# Patient Record
Sex: MALE | Race: OTHER | Employment: UNEMPLOYED | URBAN - METROPOLITAN AREA
[De-identification: names, ages, dates, MRNs, and addresses within clinical notes are randomized per-mention and may not be internally consistent; named-entity substitution may affect disease eponyms.]

---

## 2020-07-17 ENCOUNTER — HOSPITAL ENCOUNTER (EMERGENCY)
Age: 35
Discharge: HOME | End: 2020-07-18
Attending: EMERGENCY MEDICINE

## 2020-07-17 PROCEDURE — 99283 EMERGENCY DEPT VISIT LOW MDM: CPT

## 2020-07-17 RX ORDER — METHYLPREDNISOLONE SODIUM SUCCINATE 125 MG/2ML
125 INJECTION, POWDER, LYOPHILIZED, FOR SOLUTION INTRAMUSCULAR; INTRAVENOUS ONCE
Status: COMPLETED | OUTPATIENT
Start: 2020-07-17 | End: 2020-07-18

## 2020-07-17 RX ORDER — CEFAZOLIN SODIUM 1 G/50ML
1 INJECTION, SOLUTION INTRAVENOUS CONTINUOUS
Status: DISCONTINUED | OUTPATIENT
Start: 2020-07-17 | End: 2020-07-17 | Stop reason: SDUPTHER

## 2020-07-17 ASSESSMENT — ENCOUNTER SYMPTOMS
SHORTNESS OF BREATH: 0
CHEST TIGHTNESS: 0
DIARRHEA: 0
NAUSEA: 0
ABDOMINAL PAIN: 0
VOMITING: 0

## 2020-07-18 VITALS
RESPIRATION RATE: 16 BRPM | DIASTOLIC BLOOD PRESSURE: 86 MMHG | HEART RATE: 60 BPM | SYSTOLIC BLOOD PRESSURE: 150 MMHG | OXYGEN SATURATION: 96 % | TEMPERATURE: 97.8 F | WEIGHT: 210 LBS | BODY MASS INDEX: 32.89 KG/M2

## 2020-07-18 LAB
A/G RATIO: 1.2 (ref 1.1–2.2)
ALBUMIN SERPL-MCNC: 4.4 G/DL (ref 3.4–5)
ALP BLD-CCNC: 61 U/L (ref 40–129)
ALT SERPL-CCNC: 89 U/L (ref 10–40)
ANION GAP SERPL CALCULATED.3IONS-SCNC: 13 MMOL/L (ref 3–16)
AST SERPL-CCNC: 44 U/L (ref 15–37)
BASOPHILS ABSOLUTE: 0.1 K/UL (ref 0–0.2)
BASOPHILS RELATIVE PERCENT: 0.7 %
BILIRUB SERPL-MCNC: 1 MG/DL (ref 0–1)
BUN BLDV-MCNC: 20 MG/DL (ref 7–20)
C-REACTIVE PROTEIN: 2.8 MG/L (ref 0–5.1)
CALCIUM SERPL-MCNC: 9.6 MG/DL (ref 8.3–10.6)
CHLORIDE BLD-SCNC: 99 MMOL/L (ref 99–110)
CO2: 24 MMOL/L (ref 21–32)
CREAT SERPL-MCNC: 0.7 MG/DL (ref 0.9–1.3)
EOSINOPHILS ABSOLUTE: 0.4 K/UL (ref 0–0.6)
EOSINOPHILS RELATIVE PERCENT: 5.3 %
GFR AFRICAN AMERICAN: >60
GFR NON-AFRICAN AMERICAN: >60
GLOBULIN: 3.6 G/DL
GLUCOSE BLD-MCNC: 126 MG/DL (ref 70–99)
HCT VFR BLD CALC: 47.3 % (ref 40.5–52.5)
HEMOGLOBIN: 16.5 G/DL (ref 13.5–17.5)
LYMPHOCYTES ABSOLUTE: 2.5 K/UL (ref 1–5.1)
LYMPHOCYTES RELATIVE PERCENT: 31.8 %
MCH RBC QN AUTO: 31.2 PG (ref 26–34)
MCHC RBC AUTO-ENTMCNC: 35 G/DL (ref 31–36)
MCV RBC AUTO: 89.1 FL (ref 80–100)
MONOCYTES ABSOLUTE: 0.5 K/UL (ref 0–1.3)
MONOCYTES RELATIVE PERCENT: 6.6 %
NEUTROPHILS ABSOLUTE: 4.4 K/UL (ref 1.7–7.7)
NEUTROPHILS RELATIVE PERCENT: 55.6 %
PDW BLD-RTO: 13.6 % (ref 12.4–15.4)
PLATELET # BLD: 253 K/UL (ref 135–450)
PMV BLD AUTO: 8.2 FL (ref 5–10.5)
POTASSIUM SERPL-SCNC: 3.6 MMOL/L (ref 3.5–5.1)
RBC # BLD: 5.31 M/UL (ref 4.2–5.9)
SEDIMENTATION RATE, ERYTHROCYTE: 13 MM/HR (ref 0–15)
SODIUM BLD-SCNC: 136 MMOL/L (ref 136–145)
TOTAL PROTEIN: 8 G/DL (ref 6.4–8.2)
WBC # BLD: 7.9 K/UL (ref 4–11)

## 2020-07-18 PROCEDURE — 6360000002 HC RX W HCPCS: Performed by: PHYSICIAN ASSISTANT

## 2020-07-18 PROCEDURE — 96375 TX/PRO/DX INJ NEW DRUG ADDON: CPT

## 2020-07-18 PROCEDURE — 86140 C-REACTIVE PROTEIN: CPT

## 2020-07-18 PROCEDURE — 96365 THER/PROPH/DIAG IV INF INIT: CPT

## 2020-07-18 PROCEDURE — 2580000003 HC RX 258: Performed by: PHYSICIAN ASSISTANT

## 2020-07-18 PROCEDURE — 36415 COLL VENOUS BLD VENIPUNCTURE: CPT

## 2020-07-18 PROCEDURE — 80053 COMPREHEN METABOLIC PANEL: CPT

## 2020-07-18 PROCEDURE — 85025 COMPLETE CBC W/AUTO DIFF WBC: CPT

## 2020-07-18 PROCEDURE — 85652 RBC SED RATE AUTOMATED: CPT

## 2020-07-18 RX ORDER — CEPHALEXIN 500 MG/1
500 CAPSULE ORAL 3 TIMES DAILY
Qty: 30 CAPSULE | Refills: 0 | Status: SHIPPED | OUTPATIENT
Start: 2020-07-18 | End: 2020-07-28

## 2020-07-18 RX ORDER — METHYLPREDNISOLONE 4 MG/1
TABLET ORAL
Qty: 1 KIT | Refills: 0 | Status: SHIPPED | OUTPATIENT
Start: 2020-07-18

## 2020-07-18 RX ORDER — SULFAMETHOXAZOLE AND TRIMETHOPRIM 800; 160 MG/1; MG/1
1 TABLET ORAL 2 TIMES DAILY
Qty: 20 TABLET | Refills: 0 | Status: SHIPPED | OUTPATIENT
Start: 2020-07-18 | End: 2020-07-28

## 2020-07-18 RX ADMIN — METHYLPREDNISOLONE SODIUM SUCCINATE 125 MG: 125 INJECTION, POWDER, FOR SOLUTION INTRAMUSCULAR; INTRAVENOUS at 01:32

## 2020-07-18 RX ADMIN — CEFAZOLIN SODIUM 1 G: 1 INJECTION, POWDER, FOR SOLUTION INTRAMUSCULAR; INTRAVENOUS at 01:34

## 2020-07-18 NOTE — ED NOTES
Pt reports no further itching to bilateral shin areas where rash is with dried serous fluid noted on both shins. Discharged as noted. VSS prior to discharge.      Manuel Mullen Post, RN  07/18/20 0792

## 2020-07-18 NOTE — ED PROVIDER NOTES
[facility]        Pt Name: Pk Hong  MRN: [id number]  [place] 1985  Date of evaluation: 7/17/2020  Provider: Katelin Mccullough PA-C  PCP: No primary care provider on file. I have seen and evaluated this patient with my supervising physician Des Fernandez, [address] [place] [address]       Chief Complaint   Patient presents with    Cellulitis     Patient presents to ER with complaints of cellulitis/skin infection to bilateral lower legs. States it started on Monday. HISTORY OF PRESENT ILLNESS   (Location, Timing/Onset, Context/Setting, Quality, Duration, Modifying Factors, Severity, Associated Signs and Symptoms)  Note limiting factors. Pk Hong is a 38 y.o. male with no documented medical history who presents to the emergency department tonight complaining of a rash on his lower legs bilaterally. He states this began on Monday. He thought this might have been poison ivy. He states it has been very itchy and he has been scratching it. It has now become very red and weeping. He denies significant pain around the rash. He denies lower extremity edema. He denies exposure to any known allergen. Nursing Notes were all reviewed and agreed with or any disagreements were addressed in the HPI. REVIEW OF SYSTEMS    (2-9 systems for level 4, 10 or more for level 5)     Review of Systems   Constitutional: Negative for chills and fever. Respiratory: Negative for chest tightness and shortness of breath. Cardiovascular: Negative for chest pain. Gastrointestinal: Negative for abdominal pain, diarrhea, nausea and vomiting. Genitourinary: Negative for dysuria. Skin: Positive for rash. Neurological: Negative for dizziness, light-headedness and headaches. All other systems reviewed and are negative. Positives and Pertinent negatives as per HPI.   Except as noted above in the ROS, all other systems were reviewed and negative. PAST MEDICAL HISTORY   History reviewed. No pertinent past medical history. SURGICAL HISTORY   History reviewed. No pertinent surgical history. CURRENTMEDICATIONS       Previous Medications    No medications on file         ALLERGIES     Patient has no known allergies. FAMILYHISTORY     History reviewed. No pertinent family history. SOCIAL HISTORY       Social History     Tobacco Use    Smoking status: Never Smoker    Smokeless tobacco: Never Used   Substance Use Topics    Alcohol use: No    Drug use: No       SCREENINGS             PHYSICAL EXAM    (up to 7 for level 4, 8 or more for level 5)     ED Triage Vitals [07/17/20 2111]   BP Temp Temp Source Pulse Resp SpO2 Height Weight   (!) 184/99 97.8 °F (36.6 °C) Oral 66 18 100 % -- 210 lb (95.3 kg)       Physical Exam  Vitals signs and nursing note reviewed. Constitutional:       Appearance: He is well-developed. He is not diaphoretic. HENT:      Head: Normocephalic and atraumatic. Eyes:      General:         Right eye: No discharge. Left eye: No discharge. Neck:      Musculoskeletal: Normal range of motion and neck supple. Pulmonary:      Effort: Pulmonary effort is normal. No respiratory distress. Musculoskeletal: Normal range of motion. Skin:     General: Skin is warm and dry. Coloration: Skin is not pale. Findings: Rash present. Rash is vesicular. Comments: Patient has an erythematous vesicular rash over the anterior left lower leg and lateral and posterior right lower leg. There is superimposed cellulitis which I feel is likely from scratching. Rash is very weepy with a yellowish drainage. There is no purulence. Patient has no lower extremity edema. He has 2+ distal pulses. He has full range of motion of his knees and ankles bilaterally without pain. Neurological:      Mental Status: He is alert and oriented to person, place, and time.    Psychiatric: Behavior: Behavior normal.         DIAGNOSTIC RESULTS   LABS:    Labs Reviewed   COMPREHENSIVE METABOLIC PANEL - Abnormal; Notable for the following components:       Result Value    Glucose 126 (*)     CREATININE 0.7 (*)     ALT 89 (*)     AST 44 (*)     All other components within normal limits    Narrative:     Performed at:  [facility]  [address] [facility],  [place], [address]   Phone [phone]   CBC WITH AUTO DIFFERENTIAL    Narrative:     Performed at:  [facility]  [address] [facility],  [place], [address]   Phone [phone]   SEDIMENTATION RATE    Narrative:     Performed at:  [facility]  [address] [facility],  [place], [address]   Phone [phone]   C-REACTIVE PROTEIN       All other labs were within normal range or not returned as of this dictation. PROCEDURES   Unless otherwise noted below, none     Procedures    CRITICAL CARE TIME   N/A    CONSULTS:  None      EMERGENCY DEPARTMENT COURSE and DIFFERENTIAL DIAGNOSIS/MDM:   Vitals:    Vitals:    07/17/20 2111   BP: (!) 184/99   Pulse: 66   Resp: 18   Temp: 97.8 °F (36.6 °C)   TempSrc: Oral   SpO2: 100%   Weight: 210 lb (95.3 kg)       Patient was given the following medications:  Medications   methylPREDNISolone sodium (SOLU-MEDROL) injection 125 mg (125 mg Intravenous Given 7/18/20 0132)   ceFAZolin (ANCEF) 1 g in dextrose 5 % 50 mL IVPB (mini-bag) (0 g Intravenous Stopped 7/18/20 0204)           Patient presented today with a rash on his bilateral lower extremities. This appears to be some type of contact dermatitis with superimposed cellulitis from likely scratching. There is no lower extremity edema or signs of DVT. Patient has had no fevers or chills and is nonfebrile here. He believes he might have been exposed to poison ivy while working around his house. CBC, BMP, LFTs and sedimentation rate are all normal.  CRP is pending.   Patient is given Solu-Medrol and Ancef 1 g here in the emergency department. He will be discharged with Bactrim, Keflex and a Medrol Dosepak. He does not have a family doctor and is advised to return to this emergency department 5 days for reevaluation. Patient is afebrile and nontoxic in appearance. The rash is currently without the appearance or clinical features to suggest a more emergent diagnosis such as SJS, HSP, TEN, meningococcemia, endocarditis, syphillis, cellulitis, scabies, herpes simplex or erythema multiform, etc. He was given appropriate discharge instructions. Patient advised to follow-up with their family doctor within 24-48 hours and return to the emergency department for worsening symptoms. FINAL IMPRESSION      1. Rash and other nonspecific skin eruption    2. Cellulitis of both lower extremities          DISPOSITION/PLAN   DISPOSITION Decision To Discharge 07/18/2020 02:50:51 AM      PATIENT REFERREDTO:  [facility] Emergency Department  [place] [address]  [phone]  Go in 5 days  For wound re-check      DISCHARGE MEDICATIONS:  New Prescriptions    CEPHALEXIN (KEFLEX) 500 MG CAPSULE    Take 1 capsule by mouth 3 times daily for 10 days    METHYLPREDNISOLONE (MEDROL, MELISSA,) 4 MG TABLET    By mouth.     SULFAMETHOXAZOLE-TRIMETHOPRIM (BACTRIM DS) 800-160 MG PER TABLET    Take 1 tablet by mouth 2 times daily for 10 days       DISCONTINUED MEDICATIONS:  Discontinued Medications    No medications on file              (Please note that portions of this note were completed with a voice recognition program.  Efforts were made to edit the dictations but occasionally words are mis-transcribed.)    Rupa Way PA-C (electronically signed)           Rupa Way PA-C  07/18/20 0309

## 2020-07-19 NOTE — ED PROVIDER NOTES
I independently performed a history and physical on [facility]. All diagnostic, treatment, and disposition decisions were made by myself in conjunction with the advanced practice provider. Briefly, this is a 38 y.o. male here for rash to bilateral shins, left more so than right. Started 4 days ago, became more severe yesterday. On exam, Patient afebrile and nontoxic. No distress. Heart RRR. Lungs CTAB. Erythematous macular rash overlying bilateral shins. Single bullae 1x1cm bulla formatio left medial shin with serosanguinous drainage. Minimally tender, no increased warmth. Screenings            MDM    Patient afebrile and nontoxic. No distress. Rash seems most consistent with contact dermatitis versus less likely vasculitis Suspect likely overlying bacterial skin infection, no overt cellulitis. Nothing to suggest NSTI. Patient very well appearing without known co morbidities. Lab workup reassuring. Felt safe for discharge to self care with close PCP follow up for wound recheck in 48 hours. Will trial steroids, antibiotics for overlying secondary infection. [place] safe for discharge to self care and patient is agreeable. Strict return precautions discussed. Patient Referrals:  [facility] Emergency Department  [address]  Go in 5 days  For wound re-check      Discharge Medications:  Discharge Medication List as of 7/18/2020  3:01 AM      START taking these medications    Details   sulfamethoxazole-trimethoprim (BACTRIM DS) 800-160 MG per tablet Take 1 tablet by mouth 2 times daily for 10 days, Disp-20 tablet,R-0Print      cephALEXin (KEFLEX) 500 MG capsule Take 1 capsule by mouth 3 times daily for 10 days, Disp-30 capsule,R-0Print      methylPREDNISolone (MEDROL, MELISSA,) 4 MG tablet By mouth., Disp-1 kit,R-0Print             FINAL IMPRESSION  1. Rash and other nonspecific skin eruption    2.  Cellulitis of both lower extremities        Blood pressure (!) 150/86, pulse 60, temperature 97.8 °F (36.6 °C), temperature source Oral, resp. rate 16, weight 210 lb (95.3 kg), SpO2 96 %. For further details of [facility] emergency department encounter, please see documentation by advanced practice provider, LUZ MARIA Martins.     Viviana Mcclain DO (electronically signed)  Attending Emergency Physician       Viviana Mcclain DO  07/19/20 7714